# Patient Record
Sex: MALE | Race: BLACK OR AFRICAN AMERICAN
[De-identification: names, ages, dates, MRNs, and addresses within clinical notes are randomized per-mention and may not be internally consistent; named-entity substitution may affect disease eponyms.]

---

## 2020-02-03 ENCOUNTER — HOSPITAL ENCOUNTER (EMERGENCY)
Dept: HOSPITAL 97 - ER | Age: 70
Discharge: HOME | End: 2020-02-03
Payer: COMMERCIAL

## 2020-02-03 VITALS — TEMPERATURE: 98 F | SYSTOLIC BLOOD PRESSURE: 143 MMHG | DIASTOLIC BLOOD PRESSURE: 79 MMHG

## 2020-02-03 VITALS — OXYGEN SATURATION: 97 %

## 2020-02-03 DIAGNOSIS — K64.5: Primary | ICD-10-CM

## 2020-02-03 DIAGNOSIS — I10: ICD-10-CM

## 2020-02-03 LAB
ALBUMIN SERPL BCP-MCNC: 3.3 G/DL (ref 3.4–5)
ALP SERPL-CCNC: 57 U/L (ref 45–117)
ALT SERPL W P-5'-P-CCNC: 26 U/L (ref 12–78)
AST SERPL W P-5'-P-CCNC: 25 U/L (ref 15–37)
BUN BLD-MCNC: 17 MG/DL (ref 7–18)
GLUCOSE SERPLBLD-MCNC: 129 MG/DL (ref 74–106)
HCT VFR BLD CALC: 37.5 % (ref 39.6–49)
HCT VFR BLD CALC: 39.2 % (ref 39.6–49)
LIPASE SERPL-CCNC: 120 U/L (ref 73–393)
LYMPHOCYTES # SPEC AUTO: 0.9 K/UL (ref 0.7–4.9)
LYMPHOCYTES # SPEC AUTO: 1.6 K/UL (ref 0.7–4.9)
PMV BLD: 10.2 FL (ref 7.6–11.3)
PMV BLD: 10.4 FL (ref 7.6–11.3)
POTASSIUM SERPL-SCNC: 3.5 MMOL/L (ref 3.5–5.1)
RBC # BLD: 4.31 M/UL (ref 4.33–5.43)
RBC # BLD: 4.45 M/UL (ref 4.33–5.43)

## 2020-02-03 PROCEDURE — 86900 BLOOD TYPING SEROLOGIC ABO: CPT

## 2020-02-03 PROCEDURE — 80048 BASIC METABOLIC PNL TOTAL CA: CPT

## 2020-02-03 PROCEDURE — 82272 OCCULT BLD FECES 1-3 TESTS: CPT

## 2020-02-03 PROCEDURE — 86901 BLOOD TYPING SEROLOGIC RH(D): CPT

## 2020-02-03 PROCEDURE — 83690 ASSAY OF LIPASE: CPT

## 2020-02-03 PROCEDURE — 85025 COMPLETE CBC W/AUTO DIFF WBC: CPT

## 2020-02-03 PROCEDURE — 74177 CT ABD & PELVIS W/CONTRAST: CPT

## 2020-02-03 PROCEDURE — 99284 EMERGENCY DEPT VISIT MOD MDM: CPT

## 2020-02-03 PROCEDURE — 80076 HEPATIC FUNCTION PANEL: CPT

## 2020-02-03 PROCEDURE — 86850 RBC ANTIBODY SCREEN: CPT

## 2020-02-03 PROCEDURE — 36415 COLL VENOUS BLD VENIPUNCTURE: CPT

## 2020-02-03 NOTE — XMS REPORT
[facility]

 Created on:2019



Patient:Lex Gambino

Sex:Male

:1950

External Reference #:[id number]





Demographics







 Address  [address] [place] [address] [place] [address]

 

 Phone  [phone]

 

 Preferred Language  en

 

 Marital Status  Unknown

 

 Catholic Affiliation  Unknown

 

 Race  Black or 

 

 Ethnic Group  Unknown









Author







 Organization  [employer]









Care Team Providers







 Name  Role  Phone

 

 Garry Fall  Provider Role  Unavailable









Allergies, Adverse Reactions, Alerts







 Substance  Reaction  Event Type

 

 N.K.D.A.  Info Not Available  Non Drug Allergy







Problems







 Problem Type  Condition  Code  Onset Dates  Condition Status

 

 Assessment  Benign essential hypertension  I10    Active

 

 Problem  Benign essential hypertension  I10    Active

 

 Problem  Leukocytopenia, unspecified  D72.819    Active

 

 Problem  Thrombocytopenia  D69.6    Active

 

 Problem  Varicose veins of right lower  I83.811    Active



   extremity with pain      

 

 Problem  Seasonal and perennial allergic  J30.9    Active



   rhinitis      

 

 Problem  Unilateral inguinal hernia without  K40.90    Active



   obstruction or gangrene, recurrence      



   not specified      

 

 Problem  First degree AV block  I44.0    Active

 

 Problem  Hyperlipidemia, mixed  E78.2    Active

 

 Assessment  Elevated LFTs  R94.5    Active

 

 Assessment  Seasonal and perennial allergic  J30.9    Active



   rhinitis      

 

 Assessment  Thrombocytopenia  D69.6    Active

 

 Assessment  Leukocytopenia, unspecified  D72.819    Active

 

 Assessment  First degree AV block  I44.0    Active

 

 Assessment  Adult BMI 29.0-29.9 kg/sq m  Z68.29    Active

 

 Assessment  Hyperlipidemia, mixed  E78.2    Active







Medications







 Medication  Code  Code  Instructions  Start  End  Status  Dosage



   System      Date  Date    

 

 Hydrochlorothiazide  NDC  91737978722  12.5 MG Orally  Aug 06,    Active  1 
tablet



       twice a day        

 

 Hyzaar  NDC  [id number]  50-12.5 MG      Active  1 tablet



       Orally Twice a        



       day        

 

 Montelukast Sodium  NDC  [id number]  10 MG Orally      Active  1 tablet



       Once a day        in the



               evening

 

 Losartan Potassium  NDC  [id number]  50 MG Orally  Aug 06,    Active  1 tablet



       twice a day        







Results

No Known Results



Summary Purpose

[employer] Submission

## 2020-02-03 NOTE — ER
Nurse's Notes                                                                                     

 [facility]                                                                 

Name: Lex Gambino                                                                             

Age: 69 yrs                                                                                       

Sex: Male                                                                                         

: 1950                                                                                   

MRN: [id number]                                                                                   

Arrival Date: 2020                                                                          

Time: 12:30                                                                                       

Account#: [id number]                                                                            

Bed 18                                                                                            

Private MD:                                                                                       

Diagnosis: Rectal bleeding;Hemorrhoids and perianal venous thrombosis                             

                                                                                                  

Presentation:                                                                                     

                                                                                             

12:58 Presenting complaint: Patient states: when I eat i poop black and bright red blood. I   ch  

      have hemmorids. now I am getting dizzy some times. Transition of care: patient was not      

      received from another setting of care. Onset of symptoms was 2020. Risk         

      Assessment: Do you want to hurt yourself or someone else? Patient reports no desire to      

      harm self or others. Initial Sepsis Screen: Does the patient meet any 2 criteria? No.       

      Patient's initial sepsis screen is negative. Does the patient have a suspected source       

      of infection? No. Patient's initial sepsis screen is negative. Care prior to arrival:       

      None.                                                                                       

12:58 Method Of Arrival: EMS: [facility]                                                         

12:58 Acuity: RANDY 3                                                                           ch  

                                                                                                  

Triage Assessment:                                                                                

13:00 General: Appears in no apparent distress. uncomfortable, Behavior is calm, cooperative, ch  

      appropriate for age. Pain: Complains of pain in abdomen.                                    

                                                                                                  

Historical:                                                                                       

- Allergies:                                                                                      

13:00 No Known Allergies;                                                                     ch  

- Home Meds:                                                                                      

13:00 amlodipine 5 mg tab 1 tab once daily [Active]; losartan-hydrochlorothiazide 100-25 mg   ch  

      Oral tab 1 tab once daily [Active];                                                         

- PMHx:                                                                                           

13:00 Hernia; Hypertension;                                                                   ch  

- PSHx:                                                                                           

13:00 None;                                                                                   ch  

                                                                                                  

- Immunization history:: Adult Immunizations up to date.                                          

- Coronavirus screen:: The patient has NOT traveled to [place], [place], or [place] in the           

  past 14 days. The patient has NOT had contact with known/suspected case of                      

  Coronavirus?.                                                                                   

- Social history:: Smoking status: Patient denies any tobacco usage or history of.                

  Patient/guardian denies using alcohol, street drugs.                                            

- Ebola Screening: : Patient negative for fever greater than or equal to 101.5 degrees            

  Fahrenheit, and additional compatible Ebola Virus Disease symptoms Patient denies               

  exposure to infectious person Patient denies travel to an Ebola-affected area in the            

  21 days before illness onset No symptoms or risks identified at this time.                      

                                                                                                  

                                                                                                  

Screenin:13 Abuse screen: Denies threats or abuse. Denies injuries from another. Nutritional        bp  

      screening: No deficits noted. Tuberculosis screening: No symptoms or risk factors           

      identified. Fall Risk No fall in past 12 months (0 pts). No secondary diagnosis (0          

      pts). IV access (20 points). Ambulatory Aid- None/Bed Rest/Nurse Assist (0 pts). Gait-      

      Normal/Bed Rest/Wheelchair (0 pts) Mental Status- Oriented to own ability (0 pts).          

      Total Matthews Fall Scale indicates No Risk (0-24 pts).                                        

                                                                                                  

Assessment:                                                                                       

13:00 General: SEE TRIAGE NOTE.                                                               bp  

14:00 Reassessment: PT IN CT. INITIAL LAB RESULTS UNREMARKABLE.                               bp  

15:09 Reassessment: PT RETURNED FROM CT. NO ACUTE S/S AT THIS TIME, VS STABLE ON MONITOR.     bp  

15:37 Reassessment: REPEAT CBC DRAWN AND SENT. VS STABLE ON MONITOR, NO ACUTE S/S AT THIS     bp  

      TIME.                                                                                       

                                                                                                  

Vital Signs:                                                                                      

13:00 BP 88 / 52; Pulse 80; Resp 16; Temp 98.0; Pulse Ox 96% on R/A; Weight 86.18 kg; Height    

      5 ft. 7 in. (170.18 cm); Pain 0/10;                                                         

13:14  / 66; Pulse 92; Resp 18; Pulse Ox 97% ;                                          bp  

14:15  / 76; Pulse 78; Resp 14; Pulse Ox 99% ;                                          bp  

15:13  / 80; Pulse 80; Resp 12; Pulse Ox 97% ;                                          bp  

16:40  / 79; Pulse 61; Resp 20; Temp 98; Pulse Ox 97% ;                                 bp  

13:00 Body Mass Index 29.76 (86.18 kg, 170.18 cm)                                               

                                                                                                  

ED Course:                                                                                        

12:30 Patient arrived in ED.                                                                  as  

12:59 Triage completed.                                                                         

12:59 Eric Weathers MD is Attending Physician.                                              kdr 

13:00 Arm band placed on left wrist. Patient placed in an exam room.                          ch  

13:12 Moody Ojeda, ADITI is Primary Nurse.                                                    bp  

13:13 Patient has correct armband on for positive identification. Bed in low position. Call   bp  

      light in reach. Side rails up X2. Adult w/ patient. Cardiac monitor on. Pulse ox on.        

      NIBP on.                                                                                    

13:13 Inserted saline lock: 18 gauge in right antecubital area, using aseptic technique.      bp  

      Blood collected.                                                                            

13:14 Served as a chaperone during rectal exam.                                               bp  

13:39 Type And Screen Sent.                                                                   bp  

13:40 Basic Metabolic Panel Sent.                                                             bp  

13:40 Occult Blood--Ancillary Sent.                                                           bp  

13:40 CBC with Diff Sent.                                                                     bp  

13:40 Creatinine for Radiology Sent.                                                          bp  

13:40 Lipase Sent.                                                                            bp  

13:40 Hepatic Function Sent.                                                                  bp  

15:54 Repeat lab(s) drawn. by me, sent to lab. by venipuncture 21G to left ac.                3 

16:40 IV discontinued, intact, bleeding controlled, No redness/swelling at site. Pressure     bp  

      dressing applied.                                                                           

                                                                                                  

Administered Medications:                                                                         

No medications were administered                                                                  

                                                                                                  

                                                                                                  

Outcome:                                                                                          

16:22 Discharge ordered by MD.                                                                kdr 

16:41 Discharged to home ambulatory, with family.                                             bp  

16:41 Condition: stable                                                                           

16:41 Discharge instructions given to patient, Instructed on discharge instructions, follow       

      up and referral plans. Demonstrated understanding of instructions, follow-up care.          

16:49 Patient left the ED.                                                                    bp  

                                                                                                  

Signatures:                                                                                       

Sparkle Best, RN                  RN                                                      

Eric Weathers MD MD kdr Martinez, Amelia as Herrera, Deanna                              [facility]                                                  

Moody Ojeda, RN                      RN   bp                                                   

                                                                                                  

**************************************************************************************************

## 2020-02-03 NOTE — RAD REPORT
EXAM DESCRIPTION:  CT - Abdomen   Pelvis W Contrast - 2/3/2020 2:20 pm

 

CLINICAL HISTORY:  Rectal Bleeding;Abd pain

 

COMPARISON:  No comparisons

 

TECHNIQUE:  Biphasic, helical CT imaging of the abdomen and pelvis was performed following 100 ml non
-ionic IV contrast.

 

No oral contrast.

 

All CT scans are performed using dose optimization technique as appropriate and may include automated
 exposure control or mA/KV adjustment according to patient size.

 

FINDINGS:  No suspicious findings in the lung bases.

 

Liver size is normal. No solid mass of the liver identified. There are numerous variably sized homoge
neous cystic masses throughout the liver parenchyma largest at 4.9 cm in the dome. No mural nodule, s
eptation or enhancement component. Spleen and pancreas show no suspicious findings. Gallbladder and b
iliary tree are also without suspicious finding.

 

Symmetric renal function is seen with no hydronephrosis or suspicious renal mass. No pyelonephritis o
r acute parenchymal process. Bilateral renal cysts are present. No suspicious characteristics. No uri
nary bladder abnormality. No adrenal abnormalities. Prostate gland and seminal vesicles show no suspi
cious findings.

 

No stomach or small bowel suspicious finding. Appendix is normal. No dilated colon. Diverticulosis is
 present. No diverticulitis findings. Distal most rectum at the anus has limited visualization due to
 motion and inherent CT limitation. Hemorrhoid history was provided. This study is not accurate for e
valuation of a possible rectal mass. This can be correlated with digital examination as needed.

 

  No free air, free fluid or inflammatory stranding.  No hernia, mass or bulky lymphadenopathy.

 

Disc and bony degenerative changes are present most notable at L4-5. No acute bone finding. No acute 
vascular process.

 

 

IMPRESSION:  Perianal and distal most rectum mass assessment is limited on CT examination due to zac
on and inherent CT limitation.

 

Remainder of the colon shows no acute or suspicious finding.

 

Bilateral renal cysts and numerous hepatic cyst. No suspicious characteristics.

## 2020-02-03 NOTE — EDPHYS
Physician Documentation                                                                           

 [facility]                                                                 

Name: Lex Gambino                                                                             

Age: 69 yrs                                                                                       

Sex: Male                                                                                         

: 1950                                                                                   

MRN: [id number]                                                                                   

Arrival Date: 2020                                                                          

Time: 12:30                                                                                       

Account#: [id number]                                                                            

Bed 18                                                                                            

Private MD:                                                                                       

ED Physician Eric Weathers                                                                       

HPI:                                                                                              

                                                                                             

17:14 This 69 yrs old Black Male presents to ER via EMS with complaints of Rectal Bleeding.   kdr 

17:14 The patient presents to the emergency department with bleeding from the rectum/anus,    kdr 

      that is mild. Onset: The symptoms/episode began/occurred suddenly, 3 day(s) ago.            

      Context: the patient has no known special context relating to the rectal area               

      complaint(s). Modifying factors: The symptoms are alleviated by nothing, The symptoms       

      are aggravated by nothing. Associate signs and symptoms: Pertinent positives: abdominal     

      pain in the abdomen diffusely, diarrhea, lower GI bleeding, in toilet bowl, Pertinent       

      negatives: constipation, dysuria, fever, vomiting. The patient has not experienced          

      similar symptoms in the past. The patient has not recently seen a physician.                

                                                                                                  

Historical:                                                                                       

- Allergies:                                                                                      

13:00 No Known Allergies;                                                                     ch  

- Home Meds:                                                                                      

13:00 amlodipine 5 mg tab 1 tab once daily [Active]; losartan-hydrochlorothiazide 100-25 mg   ch  

      Oral tab 1 tab once daily [Active];                                                         

- PMHx:                                                                                           

13:00 Hernia; Hypertension;                                                                   ch  

- PSHx:                                                                                           

13:00 None;                                                                                   ch  

                                                                                                  

- Immunization history:: Adult Immunizations up to date.                                          

- Coronavirus screen:: The patient has NOT traveled to [place], [place], or [place] in the           

  past 14 days. The patient has NOT had contact with known/suspected case of                      

  Coronavirus?.                                                                                   

- Social history:: Smoking status: Patient denies any tobacco usage or history of.                

  Patient/guardian denies using alcohol, street drugs.                                            

- Ebola Screening: : Patient negative for fever greater than or equal to 101.5 degrees            

  Fahrenheit, and additional compatible Ebola Virus Disease symptoms Patient denies               

  exposure to infectious person Patient denies travel to an Ebola-affected area in the            

  21 days before illness onset No symptoms or risks identified at this time.                      

                                                                                                  

                                                                                                  

ROS:                                                                                              

17:14 Constitutional: Negative for fever, chills, and weight loss, Eyes: Negative for injury, kdr 

      pain, redness, and discharge, ENT: Negative for injury, pain, and discharge, Neck:          

      Negative for injury, pain, and swelling, Cardiovascular: Negative for chest pain,           

      palpitations, and edema, Respiratory: Negative for shortness of breath, cough,              

      wheezing, and pleuritic chest pain, Back: Negative for injury and pain, : Negative        

      for injury, bleeding, discharge, and swelling, MS/Extremity: Negative for injury and        

      deformity, Skin: Negative for injury, rash, and discoloration, Neuro: Negative for          

      headache, weakness, numbness, tingling, and seizure activity. Psych: Negative for           

      depression, anxiety, suicide ideation, homicidal ideation, and hallucinations,              

      Allergy/Immunology: Negative for hives, rash, and allergies, Endocrine: Negative for        

      neck swelling, polydipsia, polyuria, polyphagia, and marked weight changes.                 

17:14 Abdomen/GI: Positive for abdominal pain, diarrhea, rectal bleeding, Negative for            

      constipation, abdominal distension, black/tarry stool, rectal pain.                         

                                                                                                  

Exam:                                                                                             

17:14 Constitutional:  This is a well developed, well nourished patient who is awake, alert,  kdr 

      and in no acute distress. Head/Face:  Normocephalic, atraumatic. Eyes:  Pupils equal        

      round and reactive to light, extra-ocular motions intact.  Lids and lashes normal.          

      Conjunctiva and sclera are non-icteric and not injected.  Cornea within normal limits.      

      Periorbital areas with no swelling, redness, or edema. Neck:  Trachea midline, no           

      thyromegaly or masses palpated, and no cervical lymphadenopathy.  Supple, full range of     

      motion without nuchal rigidity, or vertebral point tenderness.  No Meningismus.             

      Chest/axilla:  Normal chest wall appearance and motion.  Nontender with no deformity.       

      No lesions are appreciated. Cardiovascular:  Regular rate and rhythm with a normal S1       

      and S2.  No gallops, murmurs, or rubs.  Normal PMI, no JVD.  No pulse deficits.             

      Respiratory:  Lungs have equal breath sounds bilaterally, clear to auscultation and         

      percussion.  No rales, rhonchi or wheezes noted.  No increased work of breathing, no        

      retractions or nasal flaring. Back:  No spinal tenderness.  No costovertebral               

      tenderness.  Full range of motion. Skin:  Warm, dry with normal turgor.  Normal color       

      with no rashes, no lesions, and no evidence of cellulitis. MS/ Extremity:  Pulses           

      equal, no cyanosis.  Neurovascular intact.  Full, normal range of motion. Neuro:  Awake     

      and alert, GCS 15, oriented to person, place, time, and situation.  Cranial nerves          

      II-XII grossly intact.  Motor strength 5/5 in all extremities.  Sensory grossly intact.     

       Cerebellar exam normal.  Normal gait. Psych:  Awake, alert, with orientation to            

      person, place and time.  Behavior, mood, and affect are within normal limits.               

17:14 Abdomen/GI: Inspection: abdomen appears normal, Bowel sounds: active, all quadrants,        

      Palpation: soft, mild abdominal tenderness, in all quadrants, Rectal exam: Prostate:        

      normal, rectal tone normal, Stool: guaiac positive, soft.                                   

                                                                                                  

Vital Signs:                                                                                      

13:00 BP 88 / 52; Pulse 80; Resp 16; Temp 98.0; Pulse Ox 96% on R/A; Weight 86.18 kg; Height  ch  

      5 ft. 7 in. (170.18 cm); Pain 0/10;                                                         

13:14  / 66; Pulse 92; Resp 18; Pulse Ox 97% ;                                          bp  

14:15  / 76; Pulse 78; Resp 14; Pulse Ox 99% ;                                          bp  

15:13  / 80; Pulse 80; Resp 12; Pulse Ox 97% ;                                          bp  

16:40  / 79; Pulse 61; Resp 20; Temp 98; Pulse Ox 97% ;                                 bp  

13:00 Body Mass Index 29.76 (86.18 kg, 170.18 cm)                                               

                                                                                                  

MDM:                                                                                              

16:22 Patient medically screened.                                                             kdr 

17:14 Data reviewed: vital signs, nurses notes, lab test result(s), EKG, radiologic studies.  kdr 

      Counseling: I had a detailed discussion with the patient and/or guardian regarding: the     

      historical points, exam findings, and any diagnostic results supporting the                 

      discharge/admit diagnosis, lab results, radiology results, the need for outpatient          

      follow up.                                                                                  

                                                                                                  

                                                                                             

13:16 Order name: Basic Metabolic Panel                                                       bp  

                                                                                             

13:16 Order name: CBC with Diff                                                               bp  

                                                                                             

13:16 Order name: Creatinine for Radiology                                                    bp  

                                                                                             

13:16 Order name: Hepatic Function                                                            bp  

                                                                                             

13:16 Order name: Lipase                                                                      bp  

                                                                                             

13:16 Order name: Type And Screen                                                             bp  

                                                                                             

13:20 Order name: Occult Blood--Ancillary                                                     bd  

                                                                                             

13:43 Order name: CBC with Automated Diff; Complete Time: 14:33                               EDMS

                                                                                             

13:48 Order name: Creatinine (Radiology Only); Complete Time: 14:33                           EDMS

                                                                                             

13:51 Order name: Basic Metabolic Panel; Complete Time: 14:33                                 EDMS

                                                                                             

13:51 Order name: Liver (Hepatic) Function; Complete Time: 14:33                              EDMS

                                                                                             

13:51 Order name: Lipase; Complete Time: 14:33                                                EDMS

                                                                                             

15:01 Order name: Type and Screen; Complete Time: 15:20                                       EDMS

                                                                                             

15:07 Order name: ABO/RH no charge; Complete Time: 15:20                                      EDMS

                                                                                             

13:16 Order name: IV Saline Lock; Complete Time: 13:17                                        bp  

                                                                                             

13:16 Order name: Labs collected and sent; Complete Time: 13:17                               bp  

                                                                                             

13:38 Order name: CT Abd/Pelvis - IV Contrast Only                                            kdr 

                                                                                             

14:39 Order name: CT; Complete Time: 15:20                                                    EDMS

                                                                                             

15:23 Order name: CBC with Diff                                                               kdr 

                                                                                             

16:02 Order name: CBC with Automated Diff; Complete Time: 16:18                               EDMS

                                                                                                  

Administered Medications:                                                                         

No medications were administered                                                                  

                                                                                                  

                                                                                                  

Disposition:                                                                                      

20 16:22 Discharged to Home. Impression: Rectal bleeding, Hemorrhoids and perianal          

  venous thrombosis.                                                                              

- Condition is Stable.                                                                            

- Discharge Instructions: Hemorrhoids, Easy-to-Read, Rectal Bleeding, Easy-to-Read.               

                                                                                                  

- Medication Reconciliation Form, Thank You Letter, Work release form form.                       

- Follow up: Private Physician; When: 2 - 3 days; Reason: If symptoms return, Further             

  diagnostic work-up, Recheck today's complaints, Continuance of care, Re-evaluation by           

  your physician.                                                                                 

- Problem is new.                                                                                 

- Symptoms have improved.                                                                         

                                                                                                  

                                                                                                  

                                                                                                  

Signatures:                                                                                       

Dispatcher [employer]                           EDMS                                                 

Sparkle Best RN                  RN                                                      

Eric Weathers MD MD   kdr                                                  

Moody Ojeda RN                      RN   bp                                                   

                                                                                                  

Corrections: (The following items were deleted from the chart)                                    

16:49 16:22 2020 16:22 Discharged to Home. Impression: Rectal bleeding; Hemorrhoids and bp  

      perianal venous thrombosis. Condition is Stable. Forms are Medication Reconciliation        

      Form, Thank You Letter, Antibiotic Education, Prescription Opioid Use. Follow up:           

      Private Physician; When: 2 - 3 days; Reason: If symptoms return, Further diagnostic         

      work-up, Recheck today's complaints, Continuance of care, Re-evaluation by your             

      physician. Problem is new. Symptoms have improved. kdr                                      

                                                                                                  

**************************************************************************************************